# Patient Record
Sex: MALE | Race: ASIAN | NOT HISPANIC OR LATINO | Employment: OTHER | URBAN - METROPOLITAN AREA
[De-identification: names, ages, dates, MRNs, and addresses within clinical notes are randomized per-mention and may not be internally consistent; named-entity substitution may affect disease eponyms.]

---

## 2019-05-01 ENCOUNTER — OFFICE VISIT - HEALTHEAST (OUTPATIENT)
Dept: PODIATRY | Facility: CLINIC | Age: 69
End: 2019-05-01

## 2019-05-01 DIAGNOSIS — M72.2 PLANTAR FASCIITIS: ICD-10-CM

## 2019-05-01 RX ORDER — GLIPIZIDE 10 MG/1
TABLET, FILM COATED, EXTENDED RELEASE ORAL
Status: SHIPPED | COMMUNITY
Start: 2019-01-04 | End: 2021-07-21

## 2019-05-01 RX ORDER — GLUCOSAMINE HCL/CHONDROITIN SU 500-400 MG
CAPSULE ORAL
Status: SHIPPED | COMMUNITY
Start: 2017-11-24

## 2019-05-01 RX ORDER — SILDENAFIL CITRATE 20 MG/1
TABLET ORAL
Status: SHIPPED | COMMUNITY
Start: 2018-12-14

## 2019-05-01 RX ORDER — MULTIPLE VITAMINS W/ MINERALS TAB 9MG-400MCG
TAB ORAL
Status: SHIPPED | COMMUNITY
Start: 2006-11-28

## 2019-05-01 RX ORDER — SIMVASTATIN 20 MG
TABLET ORAL
Status: SHIPPED | COMMUNITY
Start: 2019-01-06

## 2019-05-01 ASSESSMENT — MIFFLIN-ST. JEOR: SCORE: 1527.83

## 2019-05-03 ENCOUNTER — COMMUNICATION - HEALTHEAST (OUTPATIENT)
Dept: OTHER | Facility: CLINIC | Age: 69
End: 2019-05-03

## 2019-05-08 ENCOUNTER — AMBULATORY - HEALTHEAST (OUTPATIENT)
Dept: OTHER | Facility: CLINIC | Age: 69
End: 2019-05-08

## 2019-05-22 ENCOUNTER — AMBULATORY - HEALTHEAST (OUTPATIENT)
Dept: OTHER | Facility: CLINIC | Age: 69
End: 2019-05-22

## 2019-05-29 ENCOUNTER — OFFICE VISIT - HEALTHEAST (OUTPATIENT)
Dept: PODIATRY | Facility: CLINIC | Age: 69
End: 2019-05-29

## 2019-05-29 DIAGNOSIS — M72.2 PLANTAR FASCIITIS: ICD-10-CM

## 2019-05-29 RX ORDER — ATENOLOL 25 MG/1
12.5 TABLET ORAL DAILY
Status: SHIPPED | COMMUNITY
Start: 2019-05-29

## 2019-05-29 ASSESSMENT — MIFFLIN-ST. JEOR: SCORE: 1527.83

## 2020-01-08 ENCOUNTER — OFFICE VISIT - HEALTHEAST (OUTPATIENT)
Dept: PODIATRY | Facility: CLINIC | Age: 70
End: 2020-01-08

## 2020-01-08 DIAGNOSIS — M72.2 PLANTAR FASCIITIS: ICD-10-CM

## 2020-01-08 RX ORDER — BLOOD-GLUCOSE METER
EACH MISCELLANEOUS
Status: SHIPPED | COMMUNITY
Start: 2019-05-30

## 2020-01-08 RX ORDER — IBUPROFEN 200 MG
200 TABLET ORAL EVERY 6 HOURS PRN
Status: SHIPPED | COMMUNITY
Start: 2020-01-08

## 2020-01-08 RX ORDER — GLUCOSAM/CHON-MSM1/C/MANG/BOSW 500-416.6
TABLET ORAL
Status: SHIPPED | COMMUNITY
Start: 2019-05-30

## 2020-01-08 RX ORDER — ACETAMINOPHEN 500 MG
500 TABLET ORAL EVERY 6 HOURS PRN
Status: SHIPPED | COMMUNITY
Start: 2020-01-08

## 2020-01-21 ENCOUNTER — OFFICE VISIT - HEALTHEAST (OUTPATIENT)
Dept: PHYSICAL THERAPY | Facility: REHABILITATION | Age: 70
End: 2020-01-21

## 2020-01-21 ENCOUNTER — COMMUNICATION - HEALTHEAST (OUTPATIENT)
Dept: TELEHEALTH | Facility: CLINIC | Age: 70
End: 2020-01-21

## 2020-01-21 ENCOUNTER — AMBULATORY - HEALTHEAST (OUTPATIENT)
Dept: ADMINISTRATIVE | Facility: REHABILITATION | Age: 70
End: 2020-01-21

## 2020-01-21 DIAGNOSIS — M54.2 NECK PAIN: ICD-10-CM

## 2020-01-21 DIAGNOSIS — M54.2 CERVICALGIA: ICD-10-CM

## 2020-01-21 DIAGNOSIS — M54.12 C7 RADICULOPATHY: ICD-10-CM

## 2020-01-21 DIAGNOSIS — M54.12 CERVICAL RADICULITIS: ICD-10-CM

## 2020-01-21 DIAGNOSIS — M79.609 PAIN IN LIMB: ICD-10-CM

## 2020-01-21 DIAGNOSIS — R68.84 JAW PAIN: ICD-10-CM

## 2020-01-27 ENCOUNTER — COMMUNICATION - HEALTHEAST (OUTPATIENT)
Dept: PHYSICAL THERAPY | Facility: REHABILITATION | Age: 70
End: 2020-01-27

## 2020-02-07 ENCOUNTER — OFFICE VISIT - HEALTHEAST (OUTPATIENT)
Dept: PHYSICAL THERAPY | Facility: REHABILITATION | Age: 70
End: 2020-02-07

## 2020-02-07 DIAGNOSIS — M54.2 NECK PAIN: ICD-10-CM

## 2020-02-07 DIAGNOSIS — M54.12 CERVICAL RADICULITIS: ICD-10-CM

## 2020-02-07 DIAGNOSIS — M54.12 C7 RADICULOPATHY: ICD-10-CM

## 2020-02-07 DIAGNOSIS — M54.2 CERVICALGIA: ICD-10-CM

## 2020-02-12 ENCOUNTER — OFFICE VISIT - HEALTHEAST (OUTPATIENT)
Dept: PHYSICAL THERAPY | Facility: REHABILITATION | Age: 70
End: 2020-02-12

## 2020-02-12 DIAGNOSIS — M54.12 C7 RADICULOPATHY: ICD-10-CM

## 2020-02-12 DIAGNOSIS — M54.2 CERVICALGIA: ICD-10-CM

## 2020-02-12 DIAGNOSIS — M54.12 CERVICAL RADICULITIS: ICD-10-CM

## 2020-02-14 ENCOUNTER — OFFICE VISIT - HEALTHEAST (OUTPATIENT)
Dept: PHYSICAL THERAPY | Facility: REHABILITATION | Age: 70
End: 2020-02-14

## 2020-02-14 DIAGNOSIS — M54.2 CERVICALGIA: ICD-10-CM

## 2020-02-14 DIAGNOSIS — M54.12 CERVICAL RADICULITIS: ICD-10-CM

## 2020-02-14 DIAGNOSIS — M54.12 C7 RADICULOPATHY: ICD-10-CM

## 2020-02-14 DIAGNOSIS — M54.2 NECK PAIN: ICD-10-CM

## 2021-05-27 VITALS — TEMPERATURE: 98.6 F | DIASTOLIC BLOOD PRESSURE: 70 MMHG | SYSTOLIC BLOOD PRESSURE: 110 MMHG | HEART RATE: 80 BPM

## 2021-05-28 NOTE — PROGRESS NOTES
FOOT AND ANKLE SURGERY/PODIATRY CONSULT NOTE          ASSESSMENT:   Plantar fasciitis of both feet      TREATMENT:  Recommended cortisone injections.  Patient refused.  I recommended orthotics.        HPI: Casey Rivas is a pleasant 69-year-old male who presented to the clinic today complaining of heel pain both feet.  The patient is a retired ophthalmologist and had a history of plantar fasciitis.  Patient stated that for several years he had no symptoms.  But over the past several months he began to notice his heel pain recur.  The pain is worse on the left than on the right.  The pain is more pronounced when he first gets out of bed in the mornings.  He denies any trauma to his feet.  The patient is very active.  He exercises regularly.  The pain is easily relieved with nonweightbearing.      History reviewed. No pertinent past medical history.    History reviewed. No pertinent surgical history.    Allergies not on file    No current outpatient medications on file.    History reviewed. No pertinent family history.    Social History     Socioeconomic History     Marital status:      Spouse name: Not on file     Number of children: Not on file     Years of education: Not on file     Highest education level: Not on file   Occupational History     Not on file   Social Needs     Financial resource strain: Not on file     Food insecurity:     Worry: Not on file     Inability: Not on file     Transportation needs:     Medical: Not on file     Non-medical: Not on file   Tobacco Use     Smoking status: Never Smoker     Smokeless tobacco: Never Used   Substance and Sexual Activity     Alcohol use: Not on file     Drug use: Not on file     Sexual activity: Not on file   Lifestyle     Physical activity:     Days per week: Not on file     Minutes per session: Not on file     Stress: Not on file   Relationships     Social connections:     Talks on phone: Not on file     Gets together: Not on file     Attends  Spiritism service: Not on file     Active member of club or organization: Not on file     Attends meetings of clubs or organizations: Not on file     Relationship status: Not on file     Intimate partner violence:     Fear of current or ex partner: Not on file     Emotionally abused: Not on file     Physically abused: Not on file     Forced sexual activity: Not on file   Other Topics Concern     Not on file   Social History Narrative     Not on file       Review of Systems - Patient denies fever, chills, rash, wound, stiffness, limping, numbness, weakness, heart burn, blood in stool, chest pain with activity, calf pain when walking, shortness of breath with activity, chronic cough, easy bleeding/bruising, swelling of ankles, excessive thirst, fatigue, depression, anxiety.  Patient admits to heel pain both feet.      OBJECTIVE:  Appearance: alert, well appearing, and in no distress.    There were no vitals filed for this visit.    BMI= Body mass index is 24.25 kg/m .    General appearance: Patient is alert and fully cooperative with history & exam.  No sign of distress is noted during the visit.  Psychiatric: Affect is pleasant & appropriate.  Patient appears motivated to improve health.  Respiratory: Breathing is regular & unlabored while sitting.  HEENT: Hearing is intact to spoken word.  Speech is clear.  No gross evidence of visual impairment that would impact ambulation.    Vascular: Dorsalis pedis and posterior tibial pulses are palpable. There is pedal hair growth bilaterally.  CFT < 3 sec from anterior tibial surface to distal digits bilaterally. There is no appreciable edema noted.  Dermatologic: Turgor and texture are within normal limits. No coloration or temperature changes. No primary or secondary lesions noted.  Neurologic: All epicritic and proprioceptive sensations are grossly intact bilaterally.  Musculoskeletal: All active and passive ankle, subtalar, midtarsal, and 1st MPJ range of motion are  grossly intact without pain or crepitus, with the exception of none. Manual muscle strength is within normal limits bilaterally. All dorsiflexors, plantarflexors, invertors, evertors are intact bilaterally. Tenderness present to plantar medial aspect both heels on palpation.  No tenderness to bilateral feet or ankles with range of motion. Calf is soft/non-tender without warmth/induration    Imaging:     No results found.       TREATMENT:  I recommended cortisone injections but the patient refused.  The patient did want a new pair of orthotics.  The patient stated that if his pain does not improve he would return to the clinic for the cortisone injections as recommended.    Jason Meyer; RENATO  [facility] Foot & Ankle Surgery/Podiatry

## 2021-05-28 NOTE — PROGRESS NOTES
S: Patient was seen today at the [facility] in [place] with a prescription from Dr. Meyer for bilateral custom FOs. According to the patient, he is experiencing constant pain and discomfort at the heels (LT>RT). Pain was first noticed 6 months ago. Patient states that he has been diagnosed with type 2 DM since 2007 and uses Glipizide to control his condition. Dr. Danielito Chand MD monitors the patient's diabetic condition and was seen today. Patient states that he has neuropathy which involves numbness at the medial aspect of the hallux. Surgical history includes a LT TKA.     O: Patient is 5'10 1/2'', weighs 169.5 lbs, and current shoe size is a men's 10.5. Patient presents with bilateral long 2nd metatarsal, bilateral flexible forefoot, and 2 degrees of valgus hindfoot alignment (bilateral, reducible) with full medial longitudinal arch collapse while weight bearing. Patient's current shoes are [employer] tennis shoes men's size 11 which are in good condition, are an appropriate size, and are not Medicare compliant. Patient does not have custom FOs.  Patient was alert/oriented during the appointment and not in distress. Patient ambulates without the use of ambulatory aids.     A: Patient was explained the difference between a functional FO and a diabetic FO. Patient wants to proceed with the diabetic FOs even though the diabetic FOS primary goal is to protect the feet from ulcerations. Impressions were taken with foam blocks as the patient was semi weight bearing. Valgus hindfoot alignment was reduced during the molding procedure. FOs will be fabricated by [facility] with diabetic trilam and cork shell. FOs will follow Medicare guidelines which include being 3/16'' minimum base material with a Shore durometer of 35 or more. Custom FOs are required due to the need to accommodate patients neuropathic feet and to reduce pressure at the heels. 3 pairs of custom FOs are needed to extended durability/pressure  component of FOs and to allow for exchange of FOs for cleaning and hygiene.    G: Custom FOs will treat patient's current condition by providing support to the transverse/medial longitudinal arch and reducing valgus hindfoot alignment, protecting diabetic insensate feet, optimizing gait by reducing adverse pressure to the feet which will lead to joint stability, reduction in pain, and increase patient's ADLs.    P: Patient will return to the [facility] for fitting.     This note was electronically signed by Sky BENTLEY , ABC #[id number], License #[id number]

## 2021-05-28 NOTE — PATIENT INSTRUCTIONS - HE
Please call one of the [place] locations below to schedule an appointment. If you received a prescription please bring it with you to your appointment. Some locations are limited to what they carry.    Office Locations    [facility]  [address] [place] [address]  Orthotics and Prosthetics, [address]   Phone: [phone]    [facility]   Orthotics and Prosthetics ([facility])    [address], [place] [address]  Phone: [phone]    [facility]  [address]   [place] [address]   Phone: [phone]    [facility]  [address]  [place] [address]  Phone: [phone]    [facility]   [address]  [place] [address]  Phone: [phone]    [facility]  [address] [place] [address]  [place] [address]  Phone: [phone]    [facility]  [address]  [address]  [place] [address]  Phone: [phone]    [place]   [address] [place] [address]   Phone: [phone]

## 2021-05-29 NOTE — PROGRESS NOTES
FOOT AND ANKLE SURGERY/PODIATRY Progress Note        ASSESSMENT:   Plantar fasciitis of both feet    HPI: Casey Rivas was seen again today with continued complaints of heel pain both feet.  The patient was previously diagnosed with plantar fasciitis.  At that time cortisone injections were recommended and the patient refused.  The patient stated that he continues to have pain with weightbearing and ambulation.  He has post static dyskinesia.    History reviewed. No pertinent past medical history.    History reviewed. No pertinent surgical history.    No Known Allergies      Current Outpatient Medications:      ASCORBIC ACID, VITAMIN C, ORAL, , Disp: , Rfl:      blood glucose test (GLUCOSE BLOOD) strips, Use as directed. Pharmacy dispense brand based on insurance. Once a day glucometer., Disp: , Rfl:      CHOLECALCIFEROL, VITAMIN D3, ORAL, , Disp: , Rfl:      glipiZIDE (GLUCOTROL XL) 10 MG 24 hr tablet, TAKE 1 TABLET BY MOUTH DAILY FOR TYPE 2 DIABETES, Disp: , Rfl:      metFORMIN (GLUCOPHAGE) 1000 MG tablet, TAKE ONE TABLET BY MOUTH TWICE A DAY WITH MEALS., Disp: , Rfl:      multivitamin therapeutic w/minerals (THERAPEUTIC-M) 27-0.4 mg Tab tablet, Take by mouth., Disp: , Rfl:      sildenafil, antihypertensive, (REVATIO) 20 mg tablet, TAKE 1 TO 5 TABLETS BY MOUTH ONE HOUR BEFORE SEXUAL ACTIVITY. MAXIMUM OF 5 TABLETS PER DAY., Disp: , Rfl:      simvastatin (ZOCOR) 20 MG tablet, TAKE ONE TABLET BY MOUTH EVERY DAY AT BEDTIME, Disp: , Rfl:     Family History   Problem Relation Age of Onset     Heart attack Mother      Heart attack Father      Pacemaker Father        Social History     Socioeconomic History     Marital status:      Spouse name: Not on file     Number of children: Not on file     Years of education: Not on file     Highest education level: Not on file   Occupational History     Not on file   Social Needs     Financial resource strain: Not on file     Food insecurity:     Worry: Not on file      Inability: Not on file     Transportation needs:     Medical: Not on file     Non-medical: Not on file   Tobacco Use     Smoking status: Never Smoker     Smokeless tobacco: Never Used   Substance and Sexual Activity     Alcohol use: Not Currently     Drug use: Never     Sexual activity: Not on file   Lifestyle     Physical activity:     Days per week: Not on file     Minutes per session: Not on file     Stress: Not on file   Relationships     Social connections:     Talks on phone: Not on file     Gets together: Not on file     Attends Restorationist service: Not on file     Active member of club or organization: Not on file     Attends meetings of clubs or organizations: Not on file     Relationship status: Not on file     Intimate partner violence:     Fear of current or ex partner: Not on file     Emotionally abused: Not on file     Physically abused: Not on file     Forced sexual activity: Not on file   Other Topics Concern     Not on file   Social History Narrative     Not on file       10 point Review of Systems is negative   Vitals:    05/29/19 1041   BP: 119/82   Resp: 20   Temp: 98.8  F (37.1  C)       BMI= Body mass index is 24.25 kg/m .    OBJECTIVE:  General appearance: Patient is alert and fully cooperative with history & exam.  No sign of distress is noted during the visit.  Vascular: Dorsalis pedis and posterior tibial pulses are palpable. There is pedal hair growth bilaterally.  CFT < 3 sec from anterior tibial surface to distal digits bilaterally. There is no appreciable edema noted.  Dermatologic: Turgor and texture are within normal limits. No coloration or temperature changes. No primary or secondary lesions noted.  Neurologic: All epicritic and proprioceptive sensations are grossly intact bilaterally.  Musculoskeletal: All active and passive ankle, subtalar, midtarsal, and 1st MPJ range of motion are grossly intact without pain or crepitus, with the exception of none. Manual muscle strength is within  normal limits bilaterally. All dorsiflexors, plantarflexors, invertors, evertors are intact bilaterally. Tenderness present to plantar medial aspect both heels on palpation.  No tenderness to bilateral feet or ankles with range of motion. Calf is soft/non-tender without warmth/induration        Imaging:     No results found.         TREATMENT:  The patient was given a cortisone injection in the left heel today consisting of 2 cc of dexamethasone sodium phosphate and 1 cc of 2% lidocaine plain.  If the pain persist I recommended the patient return to the clinic in 1 week for second and final cortisone injection.        Jason Meyer; RENATO  [facility] Foot & Ankle Surgery/Podiatry

## 2021-05-29 NOTE — PROGRESS NOTES
S: Patient was seen today at the [facility] in [place] for fitting of 3 pairs of custom diabetic FOs fabricated by [facility].    O: Patient's status has not changed since last visit.    A: Custom FOs were trimmed to accommodate contours and length of patient's current shoes. Patient was able to ambulate with the FOs on during the appointment without issue.     Outcome Measure: FOs were found to be comfortable for the patient to wear (per patient feedback), valgus hindfoot alignment was reduce by the FOs while weightbearing, and there were no visible signs of excessive pressure.     Patient is satisfied with the fit and function of the FOs. Patient was given verbal instructions and [facility] instruction sheet on cleaning of the FOs, fitting issues, warranty issues, and who to contact if there is an issue.     P: Patient will go through a 5 day break in period which involves increasing the wear time in the FOs by 2 hours each day until on the 5th day the patient is wearing the FOs all day. Patient will follow up with the [facility] as needed.    This note was electronically signed by Sky BENTLEY , ABC #[id number], License #[id number]

## 2021-06-02 VITALS — HEIGHT: 70 IN | WEIGHT: 169 LBS | BODY MASS INDEX: 24.2 KG/M2

## 2021-06-02 VITALS — HEIGHT: 70 IN | BODY MASS INDEX: 24.2 KG/M2 | WEIGHT: 169 LBS

## 2021-06-05 NOTE — PROGRESS NOTES
FOOT AND ANKLE SURGERY/PODIATRY Progress Note        ASSESSMENT:   Plantar fasciitis of both feet     HPI: Casey Rivas was seen again today with continued complaints of heel pain both feet.  He indicated that the pain in the right has improved.  But he noticed the pain has increased once again on the left foot. The patient was previously diagnosed with plantar fasciitis.  The patient was requesting a second cortisone injection in the left heel today.  History reviewed. No pertinent past medical history.    History reviewed. No pertinent surgical history.    Allergies   Allergen Reactions     Aspirin Other (See Comments)     Only with 325mg. NOT with 81 mg         Current Outpatient Medications:      acetaminophen (TYLENOL) 500 MG tablet, Take 500 mg by mouth every 6 (six) hours as needed for pain., Disp: , Rfl:      ASCORBIC ACID, VITAMIN C, ORAL, , Disp: , Rfl:      aspirin 81 MG EC tablet, Take 81 mg by mouth., Disp: , Rfl:      atenolol (TENORMIN) 25 MG tablet, Take 12.5 mg by mouth daily., Disp: , Rfl:      BD ALCOHOL SWABS PadM, , Disp: , Rfl:      blood glucose test (GLUCOSE BLOOD) strips, Use as directed. Pharmacy dispense brand based on insurance. Once a day glucometer., Disp: , Rfl:      CHOLECALCIFEROL, VITAMIN D3, ORAL, , Disp: , Rfl:      cyanocobalamin, vitamin B-12, 1,000 mcg cap, Take 1,000 mcg by mouth daily., Disp: , Rfl:      glipiZIDE (GLUCOTROL XL) 10 MG 24 hr tablet, TAKE 1 TABLET BY MOUTH DAILY FOR TYPE 2 DIABETES, Disp: , Rfl:      ibuprofen (ADVIL,MOTRIN) 200 MG tablet, Take 200 mg by mouth every 6 (six) hours as needed for pain., Disp: , Rfl:      metFORMIN (GLUCOPHAGE) 1000 MG tablet, TAKE ONE TABLET BY MOUTH TWICE A DAY WITH MEALS., Disp: , Rfl:      multivitamin therapeutic w/minerals (THERAPEUTIC-M) 27-0.4 mg Tab tablet, Take by mouth., Disp: , Rfl:      sildenafil, antihypertensive, (REVATIO) 20 mg tablet, TAKE 1 TO 5 TABLETS BY MOUTH ONE HOUR BEFORE SEXUAL ACTIVITY. MAXIMUM OF 5  TABLETS PER DAY., Disp: , Rfl:      simvastatin (ZOCOR) 20 MG tablet, TAKE ONE TABLET BY MOUTH EVERY DAY AT BEDTIME, Disp: , Rfl:      TRUE METRIX AIR GLUCOSE METER, , Disp: , Rfl:      TRUE METRIX LEVEL 1 Soln, , Disp: , Rfl:      TRUEPLUS LANCETS 33 gauge Misc, , Disp: , Rfl:     Family History   Problem Relation Age of Onset     Heart attack Mother      Heart attack Father      Pacemaker Father        Social History     Socioeconomic History     Marital status:      Spouse name: Not on file     Number of children: Not on file     Years of education: Not on file     Highest education level: Not on file   Occupational History     Not on file   Social Needs     Financial resource strain: Not on file     Food insecurity:     Worry: Not on file     Inability: Not on file     Transportation needs:     Medical: Not on file     Non-medical: Not on file   Tobacco Use     Smoking status: Never Smoker     Smokeless tobacco: Never Used   Substance and Sexual Activity     Alcohol use: Not Currently     Drug use: Never     Sexual activity: Not on file   Lifestyle     Physical activity:     Days per week: Not on file     Minutes per session: Not on file     Stress: Not on file   Relationships     Social connections:     Talks on phone: Not on file     Gets together: Not on file     Attends Buddhism service: Not on file     Active member of club or organization: Not on file     Attends meetings of clubs or organizations: Not on file     Relationship status: Not on file     Intimate partner violence:     Fear of current or ex partner: Not on file     Emotionally abused: Not on file     Physically abused: Not on file     Forced sexual activity: Not on file   Other Topics Concern     Not on file   Social History Narrative     Not on file       10 point Review of Systems is negative        Vitals:    01/08/20 1531   BP: 110/70   Pulse: 80   Temp: 98.6  F (37  C)       BMI= There is no height or weight on file to calculate  BMI.    OBJECTIVE:  General appearance: Patient is alert and fully cooperative with history & exam.  No sign of distress is noted during the visit.  Vascular: Dorsalis pedis and posterior tibial pulses are palpable. There is pedal hair growth bilaterally.  CFT < 3 sec from anterior tibial surface to distal digits bilaterally. There is no appreciable edema noted.  Dermatologic: Turgor and texture are within normal limits. No coloration or temperature changes. No primary or secondary lesions noted.  Neurologic: All epicritic and proprioceptive sensations are grossly intact bilaterally.  Musculoskeletal: All active and passive ankle, subtalar, midtarsal, and 1st MPJ range of motion are grossly intact without pain or crepitus, with the exception of none. Manual muscle strength is within normal limits bilaterally. All dorsiflexors, plantarflexors, invertors, evertors are intact bilaterally. Tenderness present to plantar medial aspect both heels on palpation.  No tenderness to bilateral feet or ankles with range of motion. Calf is soft/non-tender without warmth/    Imaging:       No results found.         TREATMENT:  The patient was given a second cortisone injection in the left heel today consisting of 2 cc of dexamethasone sodium phosphate and 1 cc of 2% lidocaine plain.        Jason Meyer; RENATO  [facility] Foot & Ankle Surgery/Podiatry

## 2021-06-05 NOTE — PATIENT INSTRUCTIONS - HE
Cortisone Injections     Cortisone shots are injections that can help relieve pain and inflammation in a specific area of your body. They're most commonly injected into joints -- such as your ankle and even the small joints in your feet might benefit from cortisone shots.   The injections usually contain a corticosteroid medication and a local anesthetic. Often, you can receive one at your doctor's office. Because of potential side effects, the number of shots you can get in a year generally is limited.   Why it's done   Cortisone shots might be most effective in treating inflammatory arthritis, such as rheumatoid arthritis. They can also be part of treatment for other conditions, including:     Gout     Osteoarthritis     Rheumatoid arthritis     Tendinitis  Risks   Potential side effects of cortisone shots increase with larger doses and repeated use. Side effects can include:      Temporary facial flushing     Temporary flare of pain and inflammation in the joint     Temporary increase in blood sugar     Tendon weakening or rupture     Thinning of skin and soft tissue around the injection site     Whitening or lightening of the skin around the injection site    Limits on the number of cortisone shots   There's concern that repeated cortisone shots might damage the cartilage within a joint. So doctors typically limit the number of cortisone shots into a joint.   In general, you shouldn't get cortisone injections more often than every six weeks and usually not more than three or four times a year.   How you prepare   If you take blood thinners, you might need to stop taking them for several days before your cortisone shot to reduce bleeding or bruising risk. Some dietary supplements also have a blood-thinning effect. Ask your doctor what medications and supplements you should avoid before your cortisone shot.   Tell your doctor if you've had a temperature of 100.4 F (38 C) or greater in the previous two weeks.    What you can expect   During the cortisone shot   You will be positioned so that your doctor can easily insert the needle.   The area around the injection site is cleaned.   You'll likely feel some pressure when the needle is inserted. Let your doctor know if you have a lot of discomfort.   The medication is then released into the injection site. Typically, cortisone shots include a corticosteroid medication to relieve pain and inflammation over time and an anesthetic to provide immediate pain relief.   After the cortisone shot   Some people have redness and a feeling of warmth of the chest and face after a cortisone shot. If you have diabetes, a cortisone shot might temporarily increase your blood sugar levels.   After your cortisone shot, your doctor might ask that you:     Protect the injection area for a day or two. For instance, if you received a cortisone shot in your shoulder, avoid heavy lifting. If you received a cortisone shot in your knee, stay off your feet when you can.     Apply ice to the injection site as needed to relieve pain. Don't use heating pads.     Not use a bathtub, hot tub or whirlpool for two days. It's OK to shower.     Watch for signs of infection, including increasing pain, redness and swelling that last more than 48 hours.

## 2021-06-06 NOTE — PROGRESS NOTES
[facility] Daily Progress     Patient Name: Casey Rivas  Date: 2/14/2020  Visit #: 4  Referral Diagnosis:  Neck pain  Referring provider: Shaw Clark MD  Visit Diagnosis:     ICD-10-CM    1. Cervicalgia M54.2    2. Cervical radiculitis M54.12    3. C7 radiculopathy M54.12    4. Neck pain M54.2        No data recorded     Assessment:     Response to Intervention:  He did not have symptoms with opening jaw or moving neck after treatment today. There was very good release of fascial tensions with treatment today which should not only improve his neck/head sx but also radicular sx.     Symptoms are consistent with:  Neck pain with radicular symptoms  Patient is appropriate to continue with skilled physical therapy intervention, as indicated by initial plan of care.    Goal Status:  Pt. will demonstrate/verbalize independence in self-management of condition in : 6 weeks  Pt. will be independent with home exercise program in : 6 weeks    Pt will: demonstrate increased neck rotation to the right to 50 dgs in order to be able to turn his head when driving within 6 weeks.   Pt will: report decreased frequency of left middle finger neural symptoms so that he is able to type and perform normal acitivities without the symptoms within 6 weeks.    Other functional progress:           Plan / Patient Education:     Continue with initial plan of care.  Progress with home program as tolerated.   manual therapy    Subjective:     Pain Rating:  Resting 0  Activity:  6 - flexing the head    Response to last treatment: ok  HEP- Frequency: 3x/day, Questions or difficulties: not totally sure if they are doing anything but he can feel the back of the head soreness with the chin tucks.     Patient reports:      Still no electric shocks into the middle finger of left hand - hasn't been here for a couple of weeks.     Some residual pain at left lateral occipital ridge and left mastoid - he can get this pain when he is  eating or opening his mouth.       Objective:       MS, neural fascial tensions.   Pain below nuchal line on temporal bone and in OM suture          TREATMENT MINUTES COMMENTS   Evaluation     Self-care/ Home management     Manual therapy 25 Fascial release using Strain-Counterstrain of B abd pre-gang-N (and distal gray rami), B TMJ myochain-MS, B cervical ALL-MS, B lumbar sinu-vertebral-N   Neuromuscular Re-education 15 Recip inhib of nerual, MS fascia   Therapeutic Activity     Therapeutic Exercises 15 AA/PROM to BLE, L-T-C spine, ribs, cranium.    Gait training     Modality__________________                Total 55    Blank areas are intentional and mean the treatment did not include these items.       Shaw Rodriguez  2/14/2020

## 2021-06-06 NOTE — PROGRESS NOTES
[facility] Daily Progress     Patient Name: Casey Rivas  Date: 2/12/2020  Visit #: 3  Referral Diagnosis:  Neck pain  Referring provider: Shaw Clark MD  Visit Diagnosis:     ICD-10-CM    1. Cervicalgia M54.2    2. Cervical radiculitis M54.12    3. C7 radiculopathy M54.12        No data recorded     Assessment:     Response to Intervention:  Tolerated treatment well with decreased pain.    Symptoms are consistent with:  Neck pain with radicular symptoms  Patient is appropriate to continue with skilled physical therapy intervention, as indicated by initial plan of care.    Goal Status:  Pt. will demonstrate/verbalize independence in self-management of condition in : 6 weeks  Pt. will be independent with home exercise program in : 6 weeks    Pt will: demonstrate increased neck rotation to the right to 50 dgs in order to be able to turn his head when driving within 6 weeks.   Pt will: report decreased frequency of left middle finger neural symptoms so that he is able to type and perform normal acitivities without the symptoms within 6 weeks.    Other functional progress:           Plan / Patient Education:     Continue with initial plan of care.  Progress with home program as tolerated.   manual therapy    Subjective:     Pain Rating:  Resting 4  Activity:  6    Response to last treatment: ok  HEP- Frequency: 3x/day, Questions or difficulties:  Some discomfort with cervical retraction and levator stretch.    Patient reports:      Still no electric shocks into the middle finger of left hand.    Some residual pain at left lateral occipital ridge and left mastoid.      Objective:              Treatment Today   Manual Therapy  Manual therapy:  Neck    MFR layers 1-3 bilateral:  Suboccipitals, cervical extensors, cervical paraspinal rotators, scalenes.    Manual therapy:  Cervical longitudinal mobilization    Rate/grade Target  Direction  Relative movement Location in range Patient position   2  "Cervical vertebrae Superior Distraction of facet joints Head in neutral Supine          Exercises:  Exercise #1: UAE 3 mins  Exercise #2: Standing shoulder blade retraction 1x10  Exercise #3: Standing pectoralis stretch 1 x30 seconds   Exercise #4: Cervical retraction 1 x10  Exercise #5: levator stretch 30\" x 2             TREATMENT MINUTES COMMENTS   Evaluation     Self-care/ Home management     Manual therapy 23 See above.   Neuromuscular Re-education     Therapeutic Activity     Therapeutic Exercises 3 See exercise flow-sheet for details.    Gait training     Modality__________________                Total 26    Blank areas are intentional and mean the treatment did not include these items.       Shaw Velasquez, PT  2/12/2020     "

## 2021-06-28 NOTE — PROGRESS NOTES
Progress Notes by Shaw Velasquez PT at 1/21/2020 11:00 AM     Author: Shaw Velasquez PT Service: -- Author Type: Physical Therapist    Filed: 2/7/2020 11:37 AM Encounter Date: 1/21/2020 Status: Addendum    : Shaw Velasquez PT (Physical Therapist)    Related Notes: Original Note by Shaw Velasquez PT (Physical Therapist) filed at 1/27/2020  8:39 AM           [facility] Certification Request    January 27, 2020      Patient: Casye Rivas  MR Number: [id number]  YOB: 1950  Date of Visit: 1/21/2020      Dear Shaw Jacobs MD:    Thank you for this referral.   We are seeing Casey Rivas in Physical Therapy for  .    Medicare and/or Medicaid requires physician review and approval of the treatment plan. Please review the plan of care and verify that you agree with the therapy plan of care by co-signing this note.      Plan of Care  Authorization / Certification Start Date: 01/21/20  Authorization / Certification End Date: 04/20/20  Authorization / Certification Number of Visits: 12  Communication with: Referral Source;Patient Caregiver  Patient Related Instruction: Nature of Condition;Treatment plan and rationale;Basis of treatment;Body mechanics;Posture;Expected outcome  Times per Week: 2  Number of Weeks: 6  Number of Visits: 12  Discharge Planning: Patient will discharge home with indepence in home exercise program and self management of condition.   Therapeutic Exercise: ROM;Strengthening  Neuromuscular Reeducation: kinesio tape;posture;balance/proprioception  Manual Therapy: myofascial release;joint mobilization;soft tissue mobilization  Modalities: traction;electrical stimulation      Goals:  Pt. will demonstrate/verbalize independence in self-management of condition in : 6 weeks  Pt. will be independent with home exercise program in : 6 weeks    Pt will: demonstrate increased neck rotation to the right to 50 dgs in order to be able to turn his head  when driving within 6 weeks.   Pt will: report decreased frequency of left middle finger neural symptoms so that he is able to type and perform normal acitivities without the symptoms within 6 weeks.        If you have any questions or concerns, please don't hesitate to call.    Sincerely,      Shaw Velasquez, PT      Physician:    For Medicare/MA patients:      Physician recommendation:     ___ Follow therapist's recommendation        ___ Modify therapy      *Physician co-signature indicates they certify the need for these services furnished within this plan and while under their care.       [facility]   Cervical Thoracic Initial Evaluation    Patient Name: Casey Rivas  Date of evaluation: 1/21/2020  Referral Diagnosis:    Referring provider: Shaw Clark MD  Visit Diagnosis:     ICD-10-CM    1. Cervicalgia M54.2    2. Neck pain M54.2    3. Cervical radiculitis M54.12    4. C7 radiculopathy M54.12        Assessment:    Patient is a 70 year old male who presents to PT with cervical pain as well as left middle finger neural symptoms which the patient described as feeling like electricity is shooting thru his finger. Patient presented to therapy with increased tightness of the sub occipital muscles, normal mobility of the cervical vertebrae, and increased tenderness with palpation over the posterior and lateral cervical musculature Patient also has moderately limited cervical range of motion impairments into right rotation (37 degrees) which impairs his ability to turn his head to talk to people and to drive. Patient requires skilled therapy in order to decrease his soft tissue soreness in his cervical region, improve his ROM in his neck, and decrease the neural symptoms he is having in his left hand/middle finger. Patient is motivated and willing to work with PT.   Pt. is appropriate for skilled PT intervention as outlined in the Plan of Care (POC).    Goals:  Pt. will demonstrate/verbalize  independence in self-management of condition in : 6 weeks  Pt. will be independent with home exercise program in : 6 weeks    Pt will: demonstrate increased neck rotation to the right to 50 dgs in order to be able to turn his head when driving within 6 weeks.   Pt will: report decreased frequency of left middle finger neural symptoms so that he is able to type and perform normal acitivities without the symptoms within 6 weeks.      Patient's expectations/goals are realistic.    Barriers to Learning or Achieving Goals:  No Barriers.       Plan / Patient Instructions:        Plan of Care:   Authorization / Certification Start Date: 01/21/20  Authorization / Certification End Date: 04/20/20  Authorization / Certification Number of Visits: 12  Communication with: Referral Source;Patient Caregiver  Patient Related Instruction: Nature of Condition;Treatment plan and rationale;Basis of treatment;Body mechanics;Posture;Expected outcome  Times per Week: 2  Number of Weeks: 6  Number of Visits: 12  Discharge Planning: Patient will discharge home with indepence in home exercise program and self management of condition.   Therapeutic Exercise: ROM;Strengthening  Neuromuscular Reeducation: kinesio tape;posture;balance/proprioception  Manual Therapy: myofascial release;joint mobilization;soft tissue mobilization  Modalities: traction;electrical stimulation      Plan for next visit: Progress with treatment, including stretches, soft tissue work of the cervical/suboccipital region, and strengthening of the deep neck flexors in order to improve the patient's ROM and decrease his pain.      Subjective:         Social information:   Living Situation:lives with others    Occupation:retired   Work Status:NA   Equipment Available: None    History of Present Illness:    Casey is a 70 y.o. male who presents to therapy today with complaints of cervical pain in the neck and shooting pain. Date of onset/duration of symptoms is summer of 2019.  Onset was gradual. Symptoms are intermittent. He denies history of similar symptoms. He describes their previous level of function as not limited. Patient also noted that he occasionally has shooting pain in his left 3rd phalange, but recently this has subsided after he started taking an NSAID daily. Patient states that the shooting pain is new and limits his ability to type on his computer briefly until the pain subsides. The patient is a retired ophthalmologist.     Pain Ratin  Pain rating at best: 0  Pain rating at worst: 4  Pain description: shooting    Functional limitations are described as occurring with:   repetitive movements    Patient reports benefit from:  anti-inflammatory         Objective:      Note: Items left blank indicates the item was not performed or not indicated at the time of the evaluation.    Patient Outcome Measures :  0%  No data recorded     Scores range from 0-100%, where a score of 0% represents minimal pain and maximal function. The minmal clinically important difference is a score reduction of 10%.    Cervical Thoracic Examination  1. Cervicalgia     2. Neck pain     3. Cervical radiculitis     4. C7 radiculopathy       Involved side: Right   Posture Observation: Mildly forward trunk lean with mildly rounded shoulders. Mild forward head posture.      General sitting posture is  fair.    Cervical ROM:    Date:2020      *Indicate scale AROM AROM AROM   Cervical Flexion WNL     Cervical Extension WNL      Right Left Right Left Right Left   Cervical Sidebending 34 degrees 35degrees       Cervical Rotation 37degrees 62 degrees       Cervical Protraction      Cervical Retraction      Thoracic Flexion      Thoracic Extension      Thoracic Sidebending         Thoracic Rotation           Strength     Date: 2020      Cervical Myotomes/5 Right Left Right Left Right Left   Cervical Flexion (C1-2)         Cervical Sidebending (C3)         Shoulder Elevation (C4)         Shoulder  Abduction (C5) 4/5 4/5       Elbow Flexion (C6) 4/5 4/5       Elbow Extension (C7) 5/5 5/5       Wrist Flexion (C7)         Wrist Extension (C6)         Thumb abduction (C8)         Finger Abduction (T1)           Sensation        Reflex Testing  Cervical Dermatomes Right Left UE Reflexes Right Left   Back of the Head (C2)   Biceps (C5-6)     Supraclavicular Fossa (C3)   Brachioradialis (C5-6)     AC Joint (C4)   Triceps (C7-8)     Lateral Biceps (C5)   Hoffmanns test     Palmar Thumb (C6)   LE Reflexes     Palmar 3rd Finger (C7)   Patellar (L3-4)     Palmar 5th Finger (C8)   Achilles (S1-2)     Ulnar Forearm (T1)   Babinski Response         Flexibility: Patient is mildly limited in his shoulder range of motion and this may be secondary to a rotator cuff repair he had on the right side in 2018. Patient is mildly limited in abduction and flexion of the shoulders. Patient is moderately limited in right cervical rotation as well.   Palpation:    Passive Mobility-Joint Integrity: WNL.   Patient did not centralize or peripheralize with repeated motions testing.    Cervical Special Tests     Cervical Special Tests Right Left UE Nerve Mobility Right Left   Cervical compression   Median nerve     Cervical distraction   Ulnar nerve     Spurlings test   Radial nerve     Shoulder abduction sign   Thoracic outlet     Deep neck flexor endurance test   Radha     Upper cervical rotation   Adsons     Sharper-Jazz   Cervical rotation lateral flexion     Alar ligament test   Other:     Other: Phalen's - - Other: Tinels over carpal tunnel  - -     UE Screen: Not tested.   There is no problem list on file for this patient.      Treatment Today     TREATMENT MINUTES COMMENTS   Evaluation 55 Low complexity    Self-care/ Home management     Manual therapy     Neuromuscular Re-education     Therapeutic Activity     Therapeutic Exercises     Gait training     Modality__________________                Total 55    Blank areas are intentional  and mean the treatment did not include these items.              Shaw Velasquez  1/21/2020  11:18 AM

## 2021-06-28 NOTE — PROGRESS NOTES
Progress Notes by Jarek Enriquez, PT Student at 2/7/2020 11:30 AM     Author: Jarek Enriquez PT Student Service: -- Author Type: PT Student    Filed: 2/7/2020  2:59 PM Encounter Date: 2/7/2020 Status: Attested    : Jarek Enriquez, PT Student (PT Student) Cosigner: Shaw Velasquez, PT at 2/7/2020  3:11 PM    Attestation signed by Shaw Velasquez PT at 2/7/2020  3:11 PM    I attest that I was present in the room, making skilled assessments and directing the service provided today.  I was responsible for the assessment and treatment of the patient.  During this time, I was not engaged in treating another patient or doing other tasks.  Shaw Velasquez, PT                 .  [facility] Daily Progress     Patient Name: Casey Rivas  Date: 2/7/2020  Visit #: 2/12  PTA visit #:    Referral Diagnosis: [unfilled]  Referring provider: Shaw Clark MD  Visit Diagnosis:     ICD-10-CM    1. Neck pain M54.2    2. Cervicalgia M54.2    3. Cervical radiculitis M54.12    4. C7 radiculopathy M54.12          Assessment:   Patient tolerated the exercises well and was cued for proper form. His tolerance to the soft tissue massage was good and he stated he felt much more loose following the STM. Patient says he wants stretching and strengthening for his neck so that he can eat and rotate his head without tightness or pain. Patient is motivated and willing to work with therapy.   Patient demonstrates understanding/independence with home program.    Goal Status:  Pt. will demonstrate/verbalize independence in self-management of condition in : 6 weeks  Pt. will be independent with home exercise program in : 6 weeks    Pt will: demonstrate increased neck rotation to the right to 50 dgs in order to be able to turn his head when driving within 6 weeks.   Pt will: report decreased frequency of left middle finger neural symptoms so that he is able to type and perform normal acitivities  without the symptoms within 6 weeks.      Plan / Patient Education:     Progress with home program as tolerated.   Plan for next time is to progress stretching and strengthening program for the patient.     Subjective:   Patient has not had any shots lately and he has stopped gabapentin. Neck is slightly tight today. He is having trouble leaning forward to eat. Patient has not had the radiculopathy symptoms in 3 weeks.      Pain Ratin/10      Objective:     Patient with mild cervical ROM loss going to the left and right in rotation  Patient mildly tender to palpation over the right and left sub occipitals and upper trapezius region    No radicular symptoms    Treatment Today     Manual Therapy  STM to the suboccipitals, upper traps, and anterior scalenes (with TP and oscillations)- SUPINE and SIDELYING      Exercises:  Exercise #1: UAE 3 mins  Exercise #2: Standing shoulder blade retraction 1x10  Exercise #3: Standing pectoralis stretch 1 x30 seconds   Exercise #4: Cervical retraction 1 x10            TREATMENT MINUTES COMMENTS   Evaluation     Self-care/ Home management     Manual therapy 12 See above   Neuromuscular Re-education     Therapeutic Activity     Therapeutic Exercises 18 See above   Gait training     Modality__________________                Total 30    Blank areas are intentional and mean the treatment did not include these items.       Jarek Enriquez, SPT  2020

## 2021-06-28 NOTE — PROGRESS NOTES
Progress Notes by Jarek Enriquez, PT Student at 2/7/2020 11:30 AM     Author: Jarek Enriquez, PT Student Service: -- Author Type: PT Student    Filed: 2/7/2020 12:04 PM Encounter Date: 2/7/2020 Status: Attested    : Jarek Enriquez, PT Student (PT Student) Cosigner: Shaw Velasquez, PT at 2/7/2020  2:58 PM    Attestation signed by Shaw Velasquez, PT at 2/7/2020  2:58 PM    I attest that I was present in the room, making skilled assessments and directing the service provided today.  I was responsible for the assessment and treatment of the patient.  During this time, I was not engaged in treating another patient or doing other tasks.  Shaw Velasquez, PT                    02/07/20 1133   Exercise #1   Exercise #1 UAE 3 mins   Exercise #2   Exercise #2 Standing shoulder blade retraction 1x10   Exercise #3   Exercise #3 Standing pectoralis stretch 1 x30 seconds    Exercise #4   Exercise #4 Cervical retraction 1 x10   Exercise #5   Exercise #5 Levator stretch 30 sec bilat.

## 2021-07-07 NOTE — PROGRESS NOTES
Progress Notes by Shaw Rodriguez at 2/14/2020  2:00 PM     Author: Shaw Rodriguez Service: -- Author Type: Physical Therapist    Filed: 7/7/2021  1:58 PM Encounter Date: 2/14/2020 Status: Signed    : Shaw Rodriguez (Physical Therapist)       [facility] Discharge Summary  Patient Name: Casey Rivas  Date: 7/7/2021  Referral Diagnosis: [unfilled]  Referring provider: Shaw Clark MD  Visit Diagnosis:   1. Cervicalgia     2. Cervical radiculitis     3. C7 radiculopathy     4. Neck pain           Patient was seen for 4 visits from 1/21/20 to 2/14/20 with 0 missed appointments.  The patient met goals and has demonstrated understanding of and independence in the home program for self-care, and progression to next steps.  He will initiate contact if questions or concerns arise.    Therapy will be discontinued at this time.  The patient will need a new referral to resume.    Thank you for your referral.  Shaw Rodriguez  7/7/2021  1:58 PM

## 2021-07-21 ENCOUNTER — OFFICE VISIT (OUTPATIENT)
Dept: PODIATRY | Facility: CLINIC | Age: 71
End: 2021-07-21
Payer: COMMERCIAL

## 2021-07-21 VITALS — HEART RATE: 63 BPM | HEIGHT: 71 IN | OXYGEN SATURATION: 98 % | WEIGHT: 170 LBS | BODY MASS INDEX: 23.8 KG/M2

## 2021-07-21 DIAGNOSIS — M72.2 PLANTAR FASCIITIS: Primary | ICD-10-CM

## 2021-07-21 PROCEDURE — 20550 NJX 1 TENDON SHEATH/LIGAMENT: CPT | Performed by: PODIATRIST

## 2021-07-21 RX ORDER — DEXAMETHASONE SODIUM PHOSPHATE 4 MG/ML
4 INJECTION, SOLUTION INTRA-ARTICULAR; INTRALESIONAL; INTRAMUSCULAR; INTRAVENOUS; SOFT TISSUE ONCE
Status: COMPLETED | OUTPATIENT
Start: 2021-07-21 | End: 2021-07-21

## 2021-07-21 RX ORDER — GABAPENTIN 100 MG/1
CAPSULE ORAL
COMMUNITY
Start: 2020-12-16

## 2021-07-21 RX ORDER — LIDOCAINE HYDROCHLORIDE 20 MG/ML
1 INJECTION, SOLUTION EPIDURAL; INFILTRATION; INTRACAUDAL; PERINEURAL ONCE
Status: COMPLETED | OUTPATIENT
Start: 2021-07-21 | End: 2021-07-21

## 2021-07-21 RX ADMIN — DEXAMETHASONE SODIUM PHOSPHATE 4 MG: 4 INJECTION, SOLUTION INTRA-ARTICULAR; INTRALESIONAL; INTRAMUSCULAR; INTRAVENOUS; SOFT TISSUE at 10:35

## 2021-07-21 RX ADMIN — LIDOCAINE HYDROCHLORIDE 1 ML: 20 INJECTION, SOLUTION EPIDURAL; INFILTRATION; INTRACAUDAL; PERINEURAL at 10:38

## 2021-07-21 ASSESSMENT — PAIN SCALES - GENERAL: PAINLEVEL: NO PAIN (1)

## 2021-07-21 ASSESSMENT — MIFFLIN-ST. JEOR: SCORE: 1548.24

## 2021-07-21 NOTE — LETTER
7/21/2021         RE: Casey Rivas  [id number]  [facility] [id number]        Dear Colleague,    Thank you for referring your patient, Casey Rivas, to the [facility]. Please see a copy of my visit note below.    FOOT AND ANKLE SURGERY/PODIATRY Progress Note        ASSESSMENT:   Plantar fasciitis    HPI: Casey Rivas was seen again today with continued complaints of heel pain left foot.  He indicated that the pain in the right has improved.  But he noticed the pain has increased once again on the left foot. The patient was previously diagnosed with plantar fasciitis.  The patient was requesting a final cortisone injection in the left heel today.      History reviewed. No pertinent past medical history.     History reviewed. No pertinent surgical history.    Allergies   Allergen Reactions     Aspirin Other (See Comments)     Only with 325mg. NOT with 81 mg         Current Outpatient Medications:      acetaminophen (TYLENOL) 500 MG tablet, [ACETAMINOPHEN (TYLENOL) 500 MG TABLET] Take 500 mg by mouth every 6 (six) hours as needed for pain., Disp: , Rfl:      ASCORBIC ACID, VITAMIN C, ORAL, [ASCORBIC ACID, VITAMIN C, ORAL] , Disp: , Rfl:      aspirin 81 MG EC tablet, [ASPIRIN 81 MG EC TABLET] Take 81 mg by mouth., Disp: , Rfl:      atenolol (TENORMIN) 25 MG tablet, [ATENOLOL (TENORMIN) 25 MG TABLET] Take 12.5 mg by mouth daily., Disp: , Rfl:      BD ALCOHOL SWABS PadM, [BD ALCOHOL SWABS PADM] , Disp: , Rfl:      blood glucose test (GLUCOSE BLOOD) strips, [BLOOD GLUCOSE TEST (GLUCOSE BLOOD) STRIPS] Use as directed. Pharmacy dispense brand based on insurance. Once a day glucometer., Disp: , Rfl:      CHOLECALCIFEROL, VITAMIN D3, ORAL, [CHOLECALCIFEROL, VITAMIN D3, ORAL] , Disp: , Rfl:      cyanocobalamin, vitamin B-12, 1,000 mcg cap, [CYANOCOBALAMIN, VITAMIN B-12, 1,000 MCG CAP] Take 1,000 mcg by mouth daily., Disp: , Rfl:      gabapentin (NEURONTIN) 100 MG capsule,  , Disp: , Rfl:      ibuprofen (ADVIL,MOTRIN) 200 MG tablet, [IBUPROFEN (ADVIL,MOTRIN) 200 MG TABLET] Take 200 mg by mouth every 6 (six) hours as needed for pain., Disp: , Rfl:      metFORMIN (GLUCOPHAGE) 1000 MG tablet, [METFORMIN (GLUCOPHAGE) 1000 MG TABLET] TAKE ONE TABLET BY MOUTH TWICE A DAY WITH MEALS., Disp: , Rfl:      multivitamin therapeutic w/minerals (THERAPEUTIC-M) 27-0.4 mg Tab tablet, [MULTIVITAMIN THERAPEUTIC W/MINERALS (THERAPEUTIC-M) 27-0.4 MG TAB TABLET] Take by mouth., Disp: , Rfl:      sildenafil, antihypertensive, (REVATIO) 20 mg tablet, [SILDENAFIL, ANTIHYPERTENSIVE, (REVATIO) 20 MG TABLET] TAKE 1 TO 5 TABLETS BY MOUTH ONE HOUR BEFORE SEXUAL ACTIVITY. MAXIMUM OF 5 TABLETS PER DAY., Disp: , Rfl:      simvastatin (ZOCOR) 20 MG tablet, [SIMVASTATIN (ZOCOR) 20 MG TABLET] TAKE ONE TABLET BY MOUTH EVERY DAY AT BEDTIME, Disp: , Rfl:      TRUE METRIX AIR GLUCOSE METER, [TRUE METRIX AIR GLUCOSE METER] , Disp: , Rfl:      TRUE METRIX LEVEL 1 Soln, [TRUE METRIX LEVEL 1 SOLN] , Disp: , Rfl:      TRUEPLUS LANCETS 33 gauge Misc, [TRUEPLUS LANCETS 33 GAUGE MISC] , Disp: , Rfl:     Family History   Problem Relation Age of Onset     Coronary Artery Disease Mother      Coronary Artery Disease Father      Pacemaker Father        Social History     Socioeconomic History     Marital status:      Spouse name: Not on file     Number of children: Not on file     Years of education: Not on file     Highest education level: Not on file   Occupational History     Not on file   Tobacco Use     Smoking status: Never Smoker     Smokeless tobacco: Never Used   Substance and Sexual Activity     Alcohol use: Not Currently     Drug use: Never     Sexual activity: Not on file   Other Topics Concern     Not on file   Social History Narrative     Not on file     Social Determinants of Health     Financial Resource Strain:      Difficulty of Paying Living Expenses:    Food Insecurity:      Worried About Running Out of Food  "in the Last Year:      Ran Out of Food in the Last Year:    Transportation Needs:      Lack of Transportation (Medical):      Lack of Transportation (Non-Medical):    Physical Activity:      Days of Exercise per Week:      Minutes of Exercise per Session:    Stress:      Feeling of Stress :    Social Connections:      Frequency of Communication with Friends and Family:      Frequency of Social Gatherings with Friends and Family:      Attends [employer] Services:      Active Member of Clubs or Organizations:      Attends Club or Organization Meetings:      Marital Status:    Intimate Partner Violence:      Fear of Current or Ex-Partner:      Emotionally Abused:      Physically Abused:      Sexually Abused:        10 point Review of Systems is negative      Ht 1.803 m (5' 11\")   Wt 77.1 kg (170 lb)   BMI 23.71 kg/m      BMI= Body mass index is 23.71 kg/m .    OBJECTIVE:  General appearance: Patient is alert and fully cooperative with history & exam.  No sign of distress is noted during the visit.  Vascular: Dorsalis pedis and posterior tibial pulses are palpable. There is pedal hair growth bilaterally.  CFT < 3 sec from anterior tibial surface to distal digits bilaterally. There is no appreciable edema noted.  Dermatologic: Turgor and texture are within normal limits. No coloration or temperature changes. No primary or secondary lesions noted.  Neurologic: All epicritic and proprioceptive sensations are grossly intact bilaterally.  Musculoskeletal: All active and passive ankle, subtalar, midtarsal, and 1st MPJ range of motion are grossly intact without pain or crepitus, with the exception of none. Manual muscle strength is within normal limits bilaterally. All dorsiflexors, plantarflexors, invertors, evertors are intact bilaterally. Tenderness present to plantar medial aspect both heels on palpation.  No tenderness to bilateral feet or ankles with range of motion. Calf is soft/non-tender without warmth/    Imaging: "         No results found.         TREATMENT:  The patient was given a cortisone injection in the left heel today consisting of 1 cc of dexamethasone sodium phosphate and 1 cc of 2% lidocaine plain.  I informed the patient that if his pain persist I would recommend an x-ray of the left foot.        Jason Dennison DPM  [facility] Foot & Ankle Surgery/Podiatry         Again, thank you for allowing me to participate in the care of your patient.        Sincerely,        Jason Meyer DPM

## 2021-07-21 NOTE — PROGRESS NOTES
FOOT AND ANKLE SURGERY/PODIATRY Progress Note        ASSESSMENT:   Plantar fasciitis    HPI: Casey Rivas was seen again today with continued complaints of heel pain left foot.  He indicated that the pain in the right has improved.  But he noticed the pain has increased once again on the left foot. The patient was previously diagnosed with plantar fasciitis.  The patient was requesting a final cortisone injection in the left heel today.      History reviewed. No pertinent past medical history.     History reviewed. No pertinent surgical history.    Allergies   Allergen Reactions     Aspirin Other (See Comments)     Only with 325mg. NOT with 81 mg         Current Outpatient Medications:      acetaminophen (TYLENOL) 500 MG tablet, [ACETAMINOPHEN (TYLENOL) 500 MG TABLET] Take 500 mg by mouth every 6 (six) hours as needed for pain., Disp: , Rfl:      ASCORBIC ACID, VITAMIN C, ORAL, [ASCORBIC ACID, VITAMIN C, ORAL] , Disp: , Rfl:      aspirin 81 MG EC tablet, [ASPIRIN 81 MG EC TABLET] Take 81 mg by mouth., Disp: , Rfl:      atenolol (TENORMIN) 25 MG tablet, [ATENOLOL (TENORMIN) 25 MG TABLET] Take 12.5 mg by mouth daily., Disp: , Rfl:      BD ALCOHOL SWABS PadM, [BD ALCOHOL SWABS PADM] , Disp: , Rfl:      blood glucose test (GLUCOSE BLOOD) strips, [BLOOD GLUCOSE TEST (GLUCOSE BLOOD) STRIPS] Use as directed. Pharmacy dispense brand based on insurance. Once a day glucometer., Disp: , Rfl:      CHOLECALCIFEROL, VITAMIN D3, ORAL, [CHOLECALCIFEROL, VITAMIN D3, ORAL] , Disp: , Rfl:      cyanocobalamin, vitamin B-12, 1,000 mcg cap, [CYANOCOBALAMIN, VITAMIN B-12, 1,000 MCG CAP] Take 1,000 mcg by mouth daily., Disp: , Rfl:      gabapentin (NEURONTIN) 100 MG capsule, , Disp: , Rfl:      ibuprofen (ADVIL,MOTRIN) 200 MG tablet, [IBUPROFEN (ADVIL,MOTRIN) 200 MG TABLET] Take 200 mg by mouth every 6 (six) hours as needed for pain., Disp: , Rfl:      metFORMIN (GLUCOPHAGE) 1000 MG tablet, [METFORMIN (GLUCOPHAGE) 1000 MG TABLET] TAKE  ONE TABLET BY MOUTH TWICE A DAY WITH MEALS., Disp: , Rfl:      multivitamin therapeutic w/minerals (THERAPEUTIC-M) 27-0.4 mg Tab tablet, [MULTIVITAMIN THERAPEUTIC W/MINERALS (THERAPEUTIC-M) 27-0.4 MG TAB TABLET] Take by mouth., Disp: , Rfl:      sildenafil, antihypertensive, (REVATIO) 20 mg tablet, [SILDENAFIL, ANTIHYPERTENSIVE, (REVATIO) 20 MG TABLET] TAKE 1 TO 5 TABLETS BY MOUTH ONE HOUR BEFORE SEXUAL ACTIVITY. MAXIMUM OF 5 TABLETS PER DAY., Disp: , Rfl:      simvastatin (ZOCOR) 20 MG tablet, [SIMVASTATIN (ZOCOR) 20 MG TABLET] TAKE ONE TABLET BY MOUTH EVERY DAY AT BEDTIME, Disp: , Rfl:      TRUE METRIX AIR GLUCOSE METER, [TRUE METRIX AIR GLUCOSE METER] , Disp: , Rfl:      TRUE METRIX LEVEL 1 Soln, [TRUE METRIX LEVEL 1 SOLN] , Disp: , Rfl:      TRUEPLUS LANCETS 33 gauge Misc, [TRUEPLUS LANCETS 33 GAUGE MISC] , Disp: , Rfl:     Family History   Problem Relation Age of Onset     Coronary Artery Disease Mother      Coronary Artery Disease Father      Pacemaker Father        Social History     Socioeconomic History     Marital status:      Spouse name: Not on file     Number of children: Not on file     Years of education: Not on file     Highest education level: Not on file   Occupational History     Not on file   Tobacco Use     Smoking status: Never Smoker     Smokeless tobacco: Never Used   Substance and Sexual Activity     Alcohol use: Not Currently     Drug use: Never     Sexual activity: Not on file   Other Topics Concern     Not on file   Social History Narrative     Not on file     Social Determinants of Health     Financial Resource Strain:      Difficulty of Paying Living Expenses:    Food Insecurity:      Worried About Running Out of Food in the Last Year:      Ran Out of Food in the Last Year:    Transportation Needs:      Lack of Transportation (Medical):      Lack of Transportation (Non-Medical):    Physical Activity:      Days of Exercise per Week:      Minutes of Exercise per Session:   "  Stress:      Feeling of Stress :    Social Connections:      Frequency of Communication with Friends and Family:      Frequency of Social Gatherings with Friends and Family:      Attends [employer] Services:      Active Member of Clubs or Organizations:      Attends Club or Organization Meetings:      Marital Status:    Intimate Partner Violence:      Fear of Current or Ex-Partner:      Emotionally Abused:      Physically Abused:      Sexually Abused:        10 point Review of Systems is negative      Ht 1.803 m (5' 11\")   Wt 77.1 kg (170 lb)   BMI 23.71 kg/m      BMI= Body mass index is 23.71 kg/m .    OBJECTIVE:  General appearance: Patient is alert and fully cooperative with history & exam.  No sign of distress is noted during the visit.  Vascular: Dorsalis pedis and posterior tibial pulses are palpable. There is pedal hair growth bilaterally.  CFT < 3 sec from anterior tibial surface to distal digits bilaterally. There is no appreciable edema noted.  Dermatologic: Turgor and texture are within normal limits. No coloration or temperature changes. No primary or secondary lesions noted.  Neurologic: All epicritic and proprioceptive sensations are grossly intact bilaterally.  Musculoskeletal: All active and passive ankle, subtalar, midtarsal, and 1st MPJ range of motion are grossly intact without pain or crepitus, with the exception of none. Manual muscle strength is within normal limits bilaterally. All dorsiflexors, plantarflexors, invertors, evertors are intact bilaterally. Tenderness present to plantar medial aspect both heels on palpation.  No tenderness to bilateral feet or ankles with range of motion. Calf is soft/non-tender without warmth/    Imaging:         No results found.         TREATMENT:  The patient was given a cortisone injection in the left heel today consisting of 1 cc of dexamethasone sodium phosphate and 1 cc of 2% lidocaine plain.  I informed the patient that if his pain persist I would " recommend an x-ray of the left foot.        Jason Meyer; RENATO  [facility] Foot & Ankle Surgery/Podiatry

## 2021-07-31 ENCOUNTER — HEALTH MAINTENANCE LETTER (OUTPATIENT)
Age: 71
End: 2021-07-31

## 2021-09-25 ENCOUNTER — HEALTH MAINTENANCE LETTER (OUTPATIENT)
Age: 71
End: 2021-09-25

## 2022-08-21 ENCOUNTER — HEALTH MAINTENANCE LETTER (OUTPATIENT)
Age: 72
End: 2022-08-21

## 2022-12-26 ENCOUNTER — HEALTH MAINTENANCE LETTER (OUTPATIENT)
Age: 72
End: 2022-12-26

## 2023-09-17 ENCOUNTER — HEALTH MAINTENANCE LETTER (OUTPATIENT)
Age: 73
End: 2023-09-17